# Patient Record
Sex: FEMALE | Race: OTHER | URBAN - METROPOLITAN AREA
[De-identification: names, ages, dates, MRNs, and addresses within clinical notes are randomized per-mention and may not be internally consistent; named-entity substitution may affect disease eponyms.]

---

## 2020-02-10 ENCOUNTER — OFFICE VISIT (OUTPATIENT)
Dept: FAMILY MEDICINE CLINIC | Age: 30
End: 2020-02-10

## 2020-02-10 VITALS
HEIGHT: 60 IN | DIASTOLIC BLOOD PRESSURE: 62 MMHG | HEART RATE: 87 BPM | TEMPERATURE: 98.7 F | WEIGHT: 122 LBS | SYSTOLIC BLOOD PRESSURE: 100 MMHG | OXYGEN SATURATION: 99 % | BODY MASS INDEX: 23.95 KG/M2 | RESPIRATION RATE: 16 BRPM

## 2020-02-10 DIAGNOSIS — Z13.220 SCREENING FOR HYPERLIPIDEMIA: ICD-10-CM

## 2020-02-10 DIAGNOSIS — Z00.00 HEALTHCARE MAINTENANCE: ICD-10-CM
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
## 2020-02-10 RX ORDER — AMOXICILLIN AND CLAVULANATE POTASSIUM 875; 125 MG/1; MG/1
1 TABLET, FILM COATED ORAL 2 TIMES DAILY
Qty: 12 TAB | Refills: 0 | Status: SHIPPED | OUTPATIENT
Start: 2020-02-10 | End: 2020-02-16

## 2020-02-10 RX ORDER — AMOXICILLIN AND CLAVULANATE POTASSIUM 875; 125 MG/1; MG/1
TABLET, FILM COATED ORAL
COMMUNITY
Start: 2020-02-08

## 2020-02-10 RX ORDER — IBUPROFEN 600 MG/1
600 TABLET ORAL
Qty: 15 TAB | Refills: 0 | Status: SHIPPED | OUTPATIENT
Start: 2020-02-10

## 2020-02-10 NOTE — LETTER
NOTIFICATION RETURN TO WORK  
 
2/10/2020 3:44 PM 
 
Ms. Ban Hull [address] [facility] [address] [phone] To Whom It May Concern: 
 
Ban Hull is currently under the care of IRENE Santos. She will return to work on: 2/11/2020 If there are questions or concerns please have the patient contact our office.  
 
 
 
Sincerely, 
 
 
Eh Yoder MD

## 2020-02-10 NOTE — PROGRESS NOTES
Marianna Kim  34 y.o. female  1990  [address] [address]  <[id number]>     [facility]       Encounter Date: 2/10/2020           New Patient Visit Note: Royer Glez MD    Patient Language: Maltese  Interpretor Requested: Yes  Interpretor Number: [id number]      Reason for Appointment:  Chief Complaint   Patient presents with   [facility] Patient   [facility] Follow Up     pt seen on 2/7/20 at [facility] ED by Dr. Omar Adam for dog bite on right hand. given Augmentin and Tdap.  right hand is still swollen and red / warm to touch. History of Present Illness:  History provided by patient    Marianna Kim is a 34 y.o. female who presents to clinic today for ED follow up for dog bite. She had a dog bite her on the right hand on 2/7/2020. She went to the ED where her hand was irrigated and she was given Tdap and an rx for Augmentin. Today she reports that the swelling and pain in her right hand is getting better. She is taking ibuprofen and tylenol, which helps a little. She reports that the dog pound took the dog, and plans to check the dog for disease. Review of Systems  Denies any CP or dyspnea. All other ROS were reviewed and are negative except as discussed in HPI    Allergies: Patient has no known allergies. Medications: (Updated to reflect final medication list after visit)    Current Outpatient Medications:     amoxicillin-clavulanate (AUGMENTIN) 875-125 mg per tablet, Take 1 Tab by mouth two (2) times a day for 6 days. , Disp: 12 Tab, Rfl: 0    ibuprofen (MOTRIN) 600 mg tablet, Take 1 Tab by mouth every eight (8) hours as needed for Pain., Disp: 15 Tab, Rfl: 0    amoxicillin-clavulanate (AUGMENTIN) 875-125 mg per tablet, TAKE 1 TABLET BY MOUTH TWICE DAILY FOR 4 DAYS, Disp: , Rfl:     History  Patient Care Team:  Amanda Bhatt MD as PCP - General (Family Practice)    Past Medical History: she has no past medical history on file.     Past Surgical History: she has no past surgical history on file. Family Medical History: family history is not on file. Social History: she reports that she has never smoked. She has never used smokeless tobacco. She reports that she does not drink alcohol. Objective:   Visit Vitals  /62 (BP 1 Location: Left arm, BP Patient Position: Sitting)   Pulse 87   Temp 98.7 °F (37.1 °C) (Oral)   Resp 16   Ht 5' (1.524 m)   Wt 122 lb (55.3 kg)   SpO2 99%   BMI 23.83 kg/m²     Wt Readings from Last 3 Encounters:   02/10/20 122 lb (55.3 kg)       Physical Exam  Vitals signs reviewed. Constitutional:       General: She is not in acute distress. Appearance: Normal appearance. She is normal weight. She is not ill-appearing or toxic-appearing. HENT:      Head: Normocephalic and atraumatic. Right Ear: Hearing, tympanic membrane, ear canal and external ear normal. No drainage. No middle ear effusion. There is no impacted cerumen. Tympanic membrane is not injected or erythematous. Left Ear: Hearing, tympanic membrane, ear canal and external ear normal. No drainage. No middle ear effusion. There is no impacted cerumen. Tympanic membrane is not injected or erythematous. Nose: Nose normal. No nasal deformity or septal deviation. Mouth/Throat:      Lips: Pink. No lesions. Mouth: Mucous membranes are moist.      Palate: No mass. Pharynx: Oropharynx is clear. Uvula midline. No pharyngeal swelling, oropharyngeal exudate or posterior oropharyngeal erythema. Tonsils: No tonsillar exudate. Eyes:      General: Lids are normal. Vision grossly intact. Gaze aligned appropriately. Right eye: No discharge. Left eye: No discharge. Extraocular Movements: Extraocular movements intact. Conjunctiva/sclera: Conjunctivae normal.      Right eye: Right conjunctiva is not injected. Left eye: Left conjunctiva is not injected. Pupils: Pupils are equal, round, and reactive to light.    Neck: Musculoskeletal: Normal range of motion and neck supple. No neck rigidity or muscular tenderness. Vascular: No carotid bruit. Cardiovascular:      Rate and Rhythm: Normal rate and regular rhythm. Pulses: Normal pulses. Heart sounds: Normal heart sounds. No murmur. No friction rub. No gallop. Pulmonary:      Effort: Pulmonary effort is normal. No respiratory distress. Breath sounds: Normal breath sounds. No stridor. No wheezing, rhonchi or rales. Abdominal:      General: Bowel sounds are normal. There is no distension or abdominal bruit. Tenderness: There is no abdominal tenderness. There is no guarding. Musculoskeletal: Normal range of motion. Cervical back: Normal.   Lymphadenopathy:      Cervical: No cervical adenopathy. Skin:     General: Skin is warm. Coloration: Skin is not jaundiced. Comments: See picture   Neurological:      General: No focal deficit present. Mental Status: She is alert. Motor: Motor function is intact. Coordination: Coordination is intact. Gait: Gait is intact. Deep Tendon Reflexes:      Reflex Scores:       Patellar reflexes are 2+ on the right side and 2+ on the left side. Psychiatric:         Attention and Perception: Attention and perception normal.         Mood and Affect: Mood and affect normal.         Speech: Speech normal.         Cognition and Memory: Cognition and memory normal.                      Assessment & Plan:    Diagnoses and all orders for this visit:    1. Dog bite, subsequent encounter:  Patient with swelling and erythema. Will extend course of treatment with Augmentin for 10 day course. Will also check labs and provide ibuprofen for pain and swelling. Discussed reasons to call or go to ED. Patient expresses agreement  -     METABOLIC PANEL, COMPREHENSIVE  -     CBC WITH AUTOMATED DIFF  -     amoxicillin-clavulanate (AUGMENTIN) 875-125 mg per tablet;  Take 1 Tab by mouth two (2) times a day for 6 days. -     ibuprofen (MOTRIN) 600 mg tablet; Take 1 Tab by mouth every eight (8) hours as needed for Pain. 2. Screening for hyperlipidemia  -     LIPID PANEL    3. Healthcare maintenance  -     REFERRAL TO OBSTETRICS AND GYNECOLOGY        I have discussed the diagnosis with the patient and the intended plan as seen in the above orders. The patient has received an after-visit summary along with patient information handout. I have discussed medication side effects and warnings with the patient as well. Dispostion  Follow-up and Dispositions    · Return in about 1 week (around 2/17/2020).            Eh Yoder MD

## 2020-02-10 NOTE — PROGRESS NOTES
Chief Complaint   Patient presents with   [address] Patient   [facility] Follow Up     pt [facility] on 2/7/20 [facility] by Dr. Sal Wellington for dog bite on right hand. given Augmentin and Tdap.  right hand is still swollen and red / warm to touch. \"REVIEWED RECORD IN PREPARATION FOR VISIT AND HAVE OBTAINED THE NECESSARY DOCUMENTATION\"  1. Have you been to the ER, urgent care clinic since your last visit? Hospitalized since your last visit? Yes Where: see above    2. Have you seen or consulted any other health care providers outside of the [address] [place] [address] since your last visit? Include any pap smears or colon screening.  No

## 2020-02-10 NOTE — PATIENT INSTRUCTIONS
Mordeduras de animales: Instrucciones de cuidado - [ Animal Bites: Care Instructions ]  Instrucciones de cuidado  Después de la mordedura de un animal, la preocupación principal es virgie infección. La posibilidad de que se infecte depende del tipo de animal que lo mordió, la parte del cuerpo donde lo mordió y farmer estado de hattie general. Muchas mordeduras de animales no se cierran con puntos de sutura, ya que esto puede aumentar la probabilidad de infección. La mordedura puede tardar en sanar desde 7 días hasta varios meses, dependiendo de lo grave que sea. Cuidar lily farmer herida en casa ayudará a que sane y reducirá la probabilidad de infección. El médico lo malhotra examinado minuciosamente, ej pueden desarrollarse problemas más tarde. Si nota algún problema o nuevos síntomas, busque tratamiento médico de inmediato. La atención de seguimiento es virgie parte clave de farmer tratamiento y seguridad. Asegúrese de hacer y acudir a todas las citas, y llame a farmer médico si está teniendo problemas. También es virgie buena idea saber los resultados de fracisco exámenes y mantener virgie lista de los medicamentos que kenny. ¿Cómo puede cuidarse en el hogar? · Si farmer médico le dijo cómo cuidarse la herida, siga las instrucciones de farmer médico. Si no le ashely instrucciones, siga estos consejos generales:  ? Después de 24 a 48 horas, lávese la herida suavemente con agua limpia 2 veces al día. No se frote ni empape la herida. No use peróxido de hidrógeno (agua [place]) ni alcohol, los cuales pueden retrasar la sanación. ? Puede cubrirse la herida con virgie capa delgada de vaselina y Venkat Meckel venda no adherente. ? Aplíquese más vaselina y [facility] la venda según sea necesario. · Después de ducharse, séquese suavemente la herida con virgie toalla limpia. · Si farmer médico le malhotra [facility] herida, cúbrase la venda con virgie bolsa de plástico antes de ducharse.   · Un poco de enrojecimiento de la piel e hinchazón alrededor de los bordes de la herida y [place] suturas o grapas son normales. La herida puede picar o irritarse. No se rasque ni frote la herida. · Pregúntele a farmer médico si puede socorro un analgésico (medicamento para el dolor) de venta shannon, elaine acetaminofén (Tylenol), ibuprofeno (Advil, Motrin) o naproxeno (Aleve). Nata y siga todas las instrucciones de la [place]. · No tome dos o más analgésicos al mismo tiempo a menos que el médico se lo haya indicado. Muchos analgésicos contienen acetaminofén, lo cual es Tylenol. El exceso de acetaminofén (Tylenol) puede ser dañino. · Si farmer mordedura lo pone en riesgo de contraer malachi, le aplicarán virgie serie de vacunas a lo bhaskar de las próximas semanas para prevenir la malachi. Farmer médico le indicará cuándo vacunarse. Es muy importante que se ponga el ciclo completo de vacunas. Siga de [address] indicaciones de farmer médico.  · Puede que necesite virgie vacuna antitetánica si no le carlisle aplicado virgie en los últimos 5 [place] bren[place]. · Si farmer médico le recetó antibióticos, tómelos según las indicaciones. No deje de tomarlos solo porque se sienta mejor. Debe socorro todos los antibióticos hasta terminarlos. ¿Cuándo debe pedir ayuda? Llame a farmer médico ahora mismo o busque atención médica inmediata si:    · La piel próxima a la mordedura se vuelve fría o pálida, o cambia de color.     · Pierde sensibilidad en la misha cerca de la mordedura, o siente entumecimiento u hormigueo.     · Tiene dificultades para  virgie extremidad cercana a la mordedura.     · Tiene síntomas de infección, tales elaine:  ? Aumento del dolor, la hinchazón, la temperatura o el enrojecimiento cerca de la herida. ? Vetas rojizas que salen de la herida. ? Pus que sale de la herida. ? Yary Lu.     · La praful empapa la venda. Es normal que salgan pequeñas cantidades de Lisa.     · Farmer dolor está empeorando.    Vigile muy de cerca los cambios en farmer hattie, y asegúrese de comunicarse con farmer médico si no está mejorando elaine se esperaba.   ¿Dónde puede encontrar más información en inglés? Satish Turcios a http://paulina-james.info/. Dinh Landaverde G692 en la búsqueda para aprender más acerca de \"Mordeduras de animales: Instrucciones de cuidado - [ Animal Bites: Care Instructions ]. \"  Revisado: 26 junio, 2019  Versión del contenido: 12.2  © 3796-6842 [employer]. Las instrucciones de cuidado fueron adaptadas bajo licencia por [employer] (which disclaims liability or warranty for this information). Si usted tiene [facility] afección médica o sobre estas instrucciones, siempre pregunte a farmer profesional de hattie. [employer]wise, Incorporated niega toda garantía o responsabilidad por farmer uso de esta información.

## 2020-02-11 LAB
ALBUMIN SERPL-MCNC: 4.4 G/DL (ref 3.9–5)
ALBUMIN/GLOB SERPL: 1.6 {RATIO} (ref 1.2–2.2)
ALP SERPL-CCNC: 60 IU/L (ref 39–117)
ALT SERPL-CCNC: 12 IU/L (ref 0–32)
AST SERPL-CCNC: 13 IU/L (ref 0–40)
BASOPHILS # BLD AUTO: 0 X10E3/UL (ref 0–0.2)
BASOPHILS NFR BLD AUTO: 0 %
BILIRUB SERPL-MCNC: 0.2 MG/DL (ref 0–1.2)
BUN SERPL-MCNC: 11 MG/DL (ref 6–20)
BUN/CREAT SERPL: 15 (ref 9–23)
CALCIUM SERPL-MCNC: 9.1 MG/DL (ref 8.7–10.2)
CHLORIDE SERPL-SCNC: 107 MMOL/L (ref 96–106)
CHOLEST SERPL-MCNC: 143 MG/DL (ref 100–199)
CO2 SERPL-SCNC: 21 MMOL/L (ref 20–29)
CREAT SERPL-MCNC: 0.74 MG/DL (ref 0.57–1)
EOSINOPHIL # BLD AUTO: 0.1 X10E3/UL (ref 0–0.4)
EOSINOPHIL NFR BLD AUTO: 1 %
ERYTHROCYTE [DISTWIDTH] IN BLOOD BY AUTOMATED COUNT: 12.8 % (ref 11.7–15.4)
GLOBULIN SER CALC-MCNC: 2.7 G/DL (ref 1.5–4.5)
GLUCOSE SERPL-MCNC: 111 MG/DL (ref 65–99)
HCT VFR BLD AUTO: 40.6 % (ref 34–46.6)
HDLC SERPL-MCNC: 47 MG/DL
HGB BLD-MCNC: 13.4 G/DL (ref 11.1–15.9)
IMM GRANULOCYTES # BLD AUTO: 0 X10E3/UL (ref 0–0.1)
IMM GRANULOCYTES NFR BLD AUTO: 0 %
INTERPRETATION, 910389: NORMAL
LDLC SERPL CALC-MCNC: 80 MG/DL (ref 0–99)
LYMPHOCYTES # BLD AUTO: 1.8 X10E3/UL (ref 0.7–3.1)
LYMPHOCYTES NFR BLD AUTO: 17 %
MCH RBC QN AUTO: 29.3 PG (ref 26.6–33)
MCHC RBC AUTO-ENTMCNC: 33 G/DL (ref 31.5–35.7)
MCV RBC AUTO: 89 FL (ref 79–97)
MONOCYTES # BLD AUTO: 0.6 X10E3/UL (ref 0.1–0.9)
MONOCYTES NFR BLD AUTO: 6 %
NEUTROPHILS # BLD AUTO: 7.9 X10E3/UL (ref 1.4–7)
NEUTROPHILS NFR BLD AUTO: 76 %
PLATELET # BLD AUTO: 265 X10E3/UL (ref 150–450)
POTASSIUM SERPL-SCNC: 4 MMOL/L (ref 3.5–5.2)
PROT SERPL-MCNC: 7.1 G/DL (ref 6–8.5)
RBC # BLD AUTO: 4.58 X10E6/UL (ref 3.77–5.28)
SODIUM SERPL-SCNC: 139 MMOL/L (ref 134–144)
TRIGL SERPL-MCNC: 81 MG/DL (ref 0–149)
VLDLC SERPL CALC-MCNC: 16 MG/DL (ref 5–40)
WBC # BLD AUTO: 10.5 X10E3/UL (ref 3.4–10.8)

## 2020-03-03 NOTE — PROGRESS NOTES
Please call patient to let her know that her blood sugar was a little elevated, but the remainder of her labs are normal.

## 2020-03-06 NOTE — PROGRESS NOTES
Outbound call to patient via   number [phone]. Patients date of birth verified. Patient made aware of lab results and verbalized understanding.